# Patient Record
Sex: MALE | Race: BLACK OR AFRICAN AMERICAN | NOT HISPANIC OR LATINO | URBAN - METROPOLITAN AREA
[De-identification: names, ages, dates, MRNs, and addresses within clinical notes are randomized per-mention and may not be internally consistent; named-entity substitution may affect disease eponyms.]

---

## 2024-07-24 ENCOUNTER — EMERGENCY (EMERGENCY)
Facility: HOSPITAL | Age: 74
LOS: 0 days | Discharge: HOME | End: 2024-07-25
Attending: EMERGENCY MEDICINE
Payer: SELF-PAY

## 2024-07-24 VITALS
OXYGEN SATURATION: 97 % | HEART RATE: 85 BPM | SYSTOLIC BLOOD PRESSURE: 165 MMHG | TEMPERATURE: 98 F | DIASTOLIC BLOOD PRESSURE: 98 MMHG | RESPIRATION RATE: 19 BRPM

## 2024-07-24 VITALS
DIASTOLIC BLOOD PRESSURE: 115 MMHG | SYSTOLIC BLOOD PRESSURE: 186 MMHG | OXYGEN SATURATION: 96 % | RESPIRATION RATE: 18 BRPM | WEIGHT: 171.96 LBS | HEART RATE: 94 BPM | TEMPERATURE: 98 F | HEIGHT: 71 IN

## 2024-07-24 DIAGNOSIS — R04.0 EPISTAXIS: ICD-10-CM

## 2024-07-24 DIAGNOSIS — I10 ESSENTIAL (PRIMARY) HYPERTENSION: ICD-10-CM

## 2024-07-24 DIAGNOSIS — I16.0 HYPERTENSIVE URGENCY: ICD-10-CM

## 2024-07-24 RX ORDER — HYDRALAZINE HYDROCHLORIDE 50 MG/1
1 TABLET ORAL
Qty: 7 | Refills: 0
Start: 2024-07-24 | End: 2024-07-30

## 2024-07-24 RX ORDER — HYDRALAZINE HYDROCHLORIDE 50 MG/1
50 TABLET ORAL ONCE
Refills: 0 | Status: COMPLETED | OUTPATIENT
Start: 2024-07-24 | End: 2024-07-24

## 2024-07-24 RX ADMIN — HYDRALAZINE HYDROCHLORIDE 50 MILLIGRAM(S): 50 TABLET ORAL at 22:15

## 2024-07-24 NOTE — ED PROVIDER NOTE - PATIENT PORTAL LINK FT
You can access the FollowMyHealth Patient Portal offered by [facility] by registering at the following website: [email or web address]. By joining [facility]’s FollowMyHealth portal, you will also be able to view your health information using other applications (apps) compatible with our system.

## 2024-07-24 NOTE — ED ADULT NURSE REASSESSMENT NOTE - NS ED NURSE REASSESS COMMENT FT1
Pt awake and alert. Pt sitting up in bed, talking on the phone and with family member at bedside. Pt nose bleed stopped. No acute distress noted.

## 2024-07-24 NOTE — ED PROVIDER NOTE - PHYSICAL EXAMINATION
Vitals: HTN at 186/115  Gen: AAOx3, NAD, sitting comfortably in stretcher  Head: ncat, perrla, eomi b/l  ENT: +small oozing of blood from L nare, small blood in mouth, no evidence of trauma, dentition intact  Neck: supple, no lymphadenopathy, no midline deviation  Heart: rrr, no m/r/g  Lungs: CTA b/l, no rales/ronchi/wheezes  Abd: soft, nontender, non-distended, no rebound or guarding  Ext: no clubbing/cyanosis/edema  Neuro: sensation and muscle strength intact b/l, steady gait, no focal weakness or sensory loss

## 2024-07-24 NOTE — ED ADULT TRIAGE NOTE - CHIEF COMPLAINT QUOTE
Patient took a flight from [facility] to [place] to [place] today and has epistaxis x 5 hours continuously. Patient is still actively bleeding. Denies use of anticoagulants. Pmh htn.

## 2024-07-24 NOTE — ED ADULT NURSE NOTE - OBJECTIVE STATEMENT
74 ur old male with no PMH. Pt comes in with nosebleed for 5 hrs, which is not stopping. Pt was on a plane from [facility] and the bleeding started after landing at the airport. Pt denies pain, chest pain, SOB, dizziness, blurred vision.

## 2024-07-24 NOTE — ED PROVIDER NOTE - CARE PROVIDERS DIRECT ADDRESSES
,DirectAddress_Unknown ,DirectAddress_Unknown,[email or web address]

## 2024-07-24 NOTE — ED PROVIDER NOTE - CARE PROVIDER_API CALL
Tom Ojeda  Otolaryngology  [address], [place] [address]  Phone: [phone]  Fax: [phone]  Follow Up Time: Urgent   Tom Ojeda  Otolaryngology  [address], [place] [address]  Phone: [phone]  Fax: [phone]  Follow Up Time: Urgent    Max Zuniga  Internal Medicine  [address] [place] [id number]  Phone: [phone]  Fax: [phone]  Follow Up Time: Urgent

## 2024-07-24 NOTE — ED ADULT NURSE NOTE - NSFALLUNIVINTERV_ED_ALL_ED
Bed/Stretcher in lowest position, wheels locked, appropriate side rails in place/Call bell, personal items and telephone in reach/Instruct patient to call for assistance before getting out of bed/chair/stretcher/Non-slip footwear applied when patient is off stretcher/[place] to call system/Physically safe environment - no spills, clutter or unnecessary equipment/Purposeful proactive rounding/Room/bathroom lighting operational, light cord in reach

## 2024-07-24 NOTE — ED PROVIDER NOTE - OBJECTIVE STATEMENT
73 yo M with spontaneous bleeding from L nose for about 2-3 hours now.  Now stopping with pressure while in ER.  No inciting event/trauma.  Pt. normally does not have bloody noses.  He denies inciting event.  Denies blood thinner use.  No other complaints or associated symptoms.   ROS: negative for fever, cough, headache, chest pain, shortness of breath, abd pain, nausea, vomiting, diarrhea, rash, paresthesia, and weakness--all other systems reviewed are negative.   PMH: HTN; Meds: Denies; SH: Denies smoking/drinking/drug use

## 2024-07-24 NOTE — ED ADULT NURSE NOTE - NSFALLRISK_ED_ALL_ED
Upon review of the In Basket request we were able to locate, review, and update the patient chart as requested for Medicare AW. Thank you! Any additional questions or concerns should be emailed to the Practice Liaisons via the appropriate education email address, please do not reply via In Basket.     Thank you  Chris Butler MA No

## 2024-07-24 NOTE — ED PROVIDER NOTE - CLINICAL SUMMARY MEDICAL DECISION MAKING FREE TEXT BOX
73 yo M with L nare epistaxis, stopping now, will monitor, rhino-rocket if bleeding doesn't stop--discussed risks/benefits, hydralazine for pressure, no symptoms related to elevated pressure at this time

## 2024-07-24 NOTE — ED PROVIDER NOTE - PROVIDER TOKENS
PROVIDER:[TOKEN:[[id number]],FOLLOWUP:[Urgent]] PROVIDER:[TOKEN:[[id number]],FOLLOWUP:[Urgent]],PROVIDER:[TOKEN:[[id number]],FOLLOWUP:[Urgent]]

## 2024-07-24 NOTE — ED ADULT NURSE NOTE - CHIEF COMPLAINT QUOTE
Patient took a flight from [facility] to [place] to [place] today and has epistaxis x 5 hours continuously. Patient is still actively bleeding. Denies use of anticoagulants. Pmh htn.

## 2024-07-24 NOTE — ED PROVIDER NOTE - PROGRESS NOTE DETAILS
Pt. reports feeling better after meds, pressure improved, bleeding stopped completely   pt. agrees to f/u with primary care outpt., ENT referral given for urgent f/u   pt. understands to return to ED if symptoms worsen; will d/c with hydralazine for pressure; pt. understands to f/u with PCP regarding pressure control as he has no meds at home and needs to establish a proper medication regimen for him